# Patient Record
Sex: MALE | Race: OTHER | NOT HISPANIC OR LATINO | URBAN - METROPOLITAN AREA
[De-identification: names, ages, dates, MRNs, and addresses within clinical notes are randomized per-mention and may not be internally consistent; named-entity substitution may affect disease eponyms.]

---

## 2021-06-20 ENCOUNTER — EMERGENCY (EMERGENCY)
Facility: HOSPITAL | Age: 26
LOS: 1 days | Discharge: HOME | End: 2021-06-20
Attending: EMERGENCY MEDICINE | Admitting: EMERGENCY MEDICINE
Payer: MEDICAID

## 2021-06-20 VITALS
SYSTOLIC BLOOD PRESSURE: 115 MMHG | TEMPERATURE: 98 F | DIASTOLIC BLOOD PRESSURE: 76 MMHG | OXYGEN SATURATION: 99 % | RESPIRATION RATE: 16 BRPM | HEART RATE: 61 BPM

## 2021-06-20 VITALS
OXYGEN SATURATION: 98 % | RESPIRATION RATE: 16 BRPM | HEIGHT: 65 IN | TEMPERATURE: 99 F | SYSTOLIC BLOOD PRESSURE: 124 MMHG | DIASTOLIC BLOOD PRESSURE: 72 MMHG | HEART RATE: 79 BPM | WEIGHT: 191.8 LBS

## 2021-06-20 DIAGNOSIS — L03.311 CELLULITIS OF ABDOMINAL WALL: ICD-10-CM

## 2021-06-20 DIAGNOSIS — Z48.00 ENCOUNTER FOR CHANGE OR REMOVAL OF NONSURGICAL WOUND DRESSING: ICD-10-CM

## 2021-06-20 PROCEDURE — 74176 CT ABD & PELVIS W/O CONTRAST: CPT | Mod: 26

## 2021-06-20 PROCEDURE — 99284 EMERGENCY DEPT VISIT MOD MDM: CPT

## 2021-06-20 RX ORDER — CEPHALEXIN 500 MG
500 CAPSULE ORAL ONCE
Refills: 0 | Status: COMPLETED | OUTPATIENT
Start: 2021-06-20 | End: 2021-06-20

## 2021-06-20 RX ORDER — CEPHALEXIN 500 MG
1 CAPSULE ORAL
Qty: 40 | Refills: 0
Start: 2021-06-20 | End: 2021-06-29

## 2021-06-20 RX ADMIN — Medication 500 MILLIGRAM(S): at 19:08

## 2021-06-20 RX ADMIN — Medication 1 TABLET(S): at 19:08

## 2021-06-20 NOTE — ED PROVIDER NOTE - CARE PROVIDER_API CALL
Giovanni Oliveira (DO)  [address], [facility] [place] [address]  Phone: [phone]  Fax: [phone]  Follow Up Time:

## 2021-06-20 NOTE — ED ADULT TRIAGE NOTE - CHIEF COMPLAINT QUOTE
referred from [facility] to r/o umbilical sinus. c/o 3weeks of foul smelling discharge from belly button. currently no drainage no redness no streaking.

## 2021-06-20 NOTE — ED PROVIDER NOTE - NSFOLLOWUPINSTRUCTIONS_ED_ALL_ED_FT
Please take the antibiotics cephalexin and TMP/SMX as prescribed.  Please follow up with your primary care provider or Dr Oliveira.      Abscess    An abscess is an infected area that contains a collection of pus and debris. It can occur in almost any part of the body and occurs when the tissue gets infection. Symptoms include a painful mass that is red, warm, tender that might break open and HAVE drainage. If your health care provider gave you antibiotics make sure to take the full course and do not stop even if feeling better.     SEEK IMMEDIATE MEDICAL CARE IF YOU HAVE ANY OF THE FOLLOWING SYMPTOMS: chills, fever, muscle aches, or red streaking from the area.

## 2021-06-20 NOTE — ED ADULT NURSE NOTE - NSIMPLEMENTINTERV_GEN_ALL_ED
Implemented All Universal Safety Interventions:  Ponder to call system. Call bell, personal items and telephone within reach. Instruct patient to call for assistance. Room bathroom lighting operational. Non-slip footwear when patient is off stretcher. Physically safe environment: no spills, clutter or unnecessary equipment. Stretcher in lowest position, wheels locked, appropriate side rails in place.

## 2021-06-20 NOTE — ED PROVIDER NOTE - OBJECTIVE STATEMENT
24 y/o otherwise healthy M c/o of foul smelling pus and blood discharge from belly button for approximately a month. Currently there is no drainage, no redness, and no streaking. Pt states he is not taking any medications, and has no known allergies to medication. 24 y/o otherwise healthy M c/o of pus and blood discharge from belly button for approximately a month. Currently there is no drainage, no redness, and no streaking. Pt states he is not taking any medications, and has no known allergies to medication.

## 2021-06-20 NOTE — ED PROVIDER NOTE - PATIENT PORTAL LINK FT
You can access the FollowMyHealth Patient Portal offered by [facility] by registering at the following website: [email or web address]. By joining [facility]’s FollowMyHealth portal, you will also be able to view your health information using other applications (apps) compatible with our system.

## 2021-06-20 NOTE — ED ADULT NURSE NOTE - CHPI ED NUR SYMPTOMS NEG
no bleeding at site/no blood in mucus/no chills/no fever/no pain/no rectal pain/no redness/no vomiting

## 2023-07-02 NOTE — ED PROVIDER NOTE - CLINICAL SUMMARY MEDICAL DECISION MAKING FREE TEXT BOX
Bed: 25  Expected date:   Expected time:   Means of arrival:   Comments:  OL diff breathing   26 y/o otherwise healthy M c/o of foul smelling pus and blood discharge from belly button for approximately a month. Currently there is no drainage, no redness, and no streaking. Will order CT Abdomen. 26 y/o otherwise healthy M c/o of pus and blood discharge from belly button for approximately a month. Currently there is no drainage, no redness, and no streaking. Will order CT Abdomen. 24 y/o otherwise healthy M c/o of pus and blood discharge from belly button for approximately a month. Currently there is no drainage, no redness, and no streaking. CT abdomen shows cellulitis no abscess.  Rx Bactrim and Keflex.